# Patient Record
Sex: FEMALE | URBAN - METROPOLITAN AREA
[De-identification: names, ages, dates, MRNs, and addresses within clinical notes are randomized per-mention and may not be internally consistent; named-entity substitution may affect disease eponyms.]

---

## 2019-01-11 ENCOUNTER — OFFICE VISIT (OUTPATIENT)
Dept: URBAN - METROPOLITAN AREA CLINIC 76 | Facility: CLINIC | Age: 24
End: 2019-01-11

## 2019-01-11 DIAGNOSIS — H10.45 OTHER CHRONIC ALLERGIC CONJUNCTIVITIS: ICD-10-CM

## 2019-01-11 PROCEDURE — 92014 COMPRE OPH EXAM EST PT 1/>: CPT | Performed by: OPTOMETRIST

## 2019-01-11 ASSESSMENT — INTRAOCULAR PRESSURE
OD: 12
OS: 12

## 2019-01-11 ASSESSMENT — KERATOMETRY
OD: 44.25
OS: 44.38

## 2019-01-11 ASSESSMENT — VISUAL ACUITY
OD: 20/20
OS: 20/20

## 2019-01-11 NOTE — IMPRESSION/PLAN
Impression: Myopia, bilateral: H52.13. Plan: Discussed condition. New mrx given today. Pt to call with any concerns. Recommend polarized sunglasses.

## 2019-01-11 NOTE — IMPRESSION/PLAN
Impression: Other chronic allergic conjunctivitis: H10.45. Plan: Discussed diagnosis with patient. Recommend OTC Ketotifen 1 drop bid ou, prn. Rub excess into lids.

## 2019-02-26 ENCOUNTER — TESTING ONLY (OUTPATIENT)
Dept: URBAN - METROPOLITAN AREA CLINIC 76 | Facility: CLINIC | Age: 24
End: 2019-02-26

## 2019-02-26 DIAGNOSIS — H52.13 MYOPIA, BILATERAL: Primary | ICD-10-CM

## 2019-02-26 PROCEDURE — V2799 MISC VISION ITEM OR SERVICE: HCPCS | Performed by: OPTOMETRIST

## 2019-02-26 NOTE — IMPRESSION/PLAN
Impression: Myopia, bilateral: H52.13. Very slight change. Removed cyl OU. Plan: Discussed condition. Dr. Alonzo Chavez. Pt to call with any concerns.

## 2024-07-10 ENCOUNTER — OFFICE VISIT (OUTPATIENT)
Dept: URBAN - METROPOLITAN AREA CLINIC 76 | Facility: CLINIC | Age: 29
End: 2024-07-10

## 2024-07-10 DIAGNOSIS — H00.021 HORDEOLUM INTERNUM RIGHT UPPER EYELID: Primary | ICD-10-CM

## 2024-07-10 DIAGNOSIS — H10.45 OTHER CHRONIC ALLERGIC CONJUNCTIVITIS: ICD-10-CM

## 2024-07-10 PROCEDURE — 99202 OFFICE O/P NEW SF 15 MIN: CPT | Performed by: OPTOMETRIST

## 2024-07-10 RX ORDER — OLOPATADINE HYDROCHLORIDE OPHTHALMIC 1 MG/ML
0.1 % SOLUTION/ DROPS OPHTHALMIC
Qty: 15 | Refills: 1 | Status: INACTIVE
Start: 2024-07-10 | End: 2024-07-12

## 2024-07-10 RX ORDER — DICLOXACILLIN SODIUM 500 MG/1
500 MG CAPSULE ORAL
Qty: 20 | Refills: 0 | Status: ACTIVE
Start: 2024-07-10

## 2024-07-10 ASSESSMENT — INTRAOCULAR PRESSURE
OS: 11
OD: 11